# Patient Record
Sex: MALE | Race: WHITE
[De-identification: names, ages, dates, MRNs, and addresses within clinical notes are randomized per-mention and may not be internally consistent; named-entity substitution may affect disease eponyms.]

---

## 2018-08-21 ENCOUNTER — HOSPITAL ENCOUNTER (EMERGENCY)
Dept: HOSPITAL 97 - ER | Age: 58
Discharge: HOME | End: 2018-08-21
Payer: COMMERCIAL

## 2018-08-21 DIAGNOSIS — Z88.0: ICD-10-CM

## 2018-08-21 DIAGNOSIS — I10: ICD-10-CM

## 2018-08-21 DIAGNOSIS — K29.70: Primary | ICD-10-CM

## 2018-08-21 LAB
ALBUMIN SERPL BCP-MCNC: 3.6 G/DL (ref 3.4–5)
ALP SERPL-CCNC: 88 U/L (ref 45–117)
ALT SERPL W P-5'-P-CCNC: 33 U/L (ref 12–78)
AMYLASE SERPL-CCNC: 65 U/L (ref 25–115)
AST SERPL W P-5'-P-CCNC: 28 U/L (ref 15–37)
BUN BLD-MCNC: 21 MG/DL (ref 7–18)
CKMB CREATINE KINASE MB: 1.5 NG/ML (ref 0.3–3.6)
GLUCOSE SERPLBLD-MCNC: 106 MG/DL (ref 74–106)
HCT VFR BLD CALC: 43.2 % (ref 39.6–49)
LIPASE SERPL-CCNC: 190 U/L (ref 73–393)
LYMPHOCYTES # SPEC AUTO: 2.6 K/UL (ref 0.7–4.9)
MCH RBC QN AUTO: 29.3 PG (ref 27–35)
MCV RBC: 87.8 FL (ref 80–100)
PMV BLD: 10.2 FL (ref 7.6–11.3)
POTASSIUM SERPL-SCNC: 4.3 MMOL/L (ref 3.5–5.1)
RBC # BLD: 4.92 M/UL (ref 4.33–5.43)
UA COMPLETE W REFLEX CULTURE PNL UR: (no result)

## 2018-08-21 PROCEDURE — 81015 MICROSCOPIC EXAM OF URINE: CPT

## 2018-08-21 PROCEDURE — 83690 ASSAY OF LIPASE: CPT

## 2018-08-21 PROCEDURE — 82550 ASSAY OF CK (CPK): CPT

## 2018-08-21 PROCEDURE — 99284 EMERGENCY DEPT VISIT MOD MDM: CPT

## 2018-08-21 PROCEDURE — 80076 HEPATIC FUNCTION PANEL: CPT

## 2018-08-21 PROCEDURE — 36415 COLL VENOUS BLD VENIPUNCTURE: CPT

## 2018-08-21 PROCEDURE — 82150 ASSAY OF AMYLASE: CPT

## 2018-08-21 PROCEDURE — 80048 BASIC METABOLIC PNL TOTAL CA: CPT

## 2018-08-21 PROCEDURE — 81003 URINALYSIS AUTO W/O SCOPE: CPT

## 2018-08-21 PROCEDURE — 93005 ELECTROCARDIOGRAM TRACING: CPT

## 2018-08-21 PROCEDURE — 85025 COMPLETE CBC W/AUTO DIFF WBC: CPT

## 2018-08-21 PROCEDURE — 82553 CREATINE MB FRACTION: CPT

## 2018-08-21 PROCEDURE — 84484 ASSAY OF TROPONIN QUANT: CPT

## 2018-08-21 NOTE — EKG
Test Date:    2018-08-21               Test Time:    01:29:15

Technician:   AK                                     

                                                     

MEASUREMENT RESULTS:                                       

Intervals:                                           

Rate:         74                                     

VA:           134                                    

QRSD:         96                                     

QT:           390                                    

QTc:          432                                    

Axis:                                                

P:            48                                     

VA:           134                                    

QRS:          45                                     

T:            30                                     

                                                     

INTERPRETIVE STATEMENTS:                                       

                                                     

Normal sinus rhythm

Normal ECG

No previous ECG available for comparison



Electronically Signed On 08-21-18 12:44:19 CDT by [employer]

## 2018-08-21 NOTE — EDPHYS
Physician Documentation                                                                           

 [facility]                                                                

Name: Julian Stevenson                                                                               

Age: 58 yrs                                                                                       

Sex: Male                                                                                         

: 1960                                                                                   

MRN: [id number]                                                                                   

Arrival Date: 2018                                                                          

Time: 01:03                                                                                       

Account#: [id number]                                                                            

Bed 14                                                                                            

Private MD: Donald Torres T                                                                        

ED Physician Jorge Hamilton                                                                     

HPI:                                                                                              

                                                                                             

01:49 This 58 yrs old Unknown Male presents to ER via Ambulatory with complaints of Abdominal tw4 

      Pain.                                                                                       

01:49 The patient presents with abdominal pain. Onset: The symptoms/episode began/occurred    tw4 

      just prior to arrival. The symptoms do not radiate. Associated signs and symptoms:          

      none. The symptoms are described as dull. Modifying factors: The symptoms are               

      alleviated by nothing, the symptoms are aggravated by nothing. Severity of pain: At its     

      worst the pain was moderate in the emergency department the pain has improved. The          

      patient has not experienced similar symptoms in the past.                                   

                                                                                                  

Historical:                                                                                       

- Allergies:                                                                                      

01:17 PENICILLINS;                                                                            aa1 

- Home Meds:                                                                                      

01:17 None [Active];                                                                          aa1 

- PMHx:                                                                                           

01:17 Hypertension;                                                                           aa1 

- PSHx:                                                                                           

01:17 None;                                                                                   aa1 

                                                                                                  

- Immunization history:: Flu vaccine is not up to date.                                           

- Social history:: Smoking status: Patient/guardian denies using tobacco.                         

- Ebola Screening: : No symptoms or risks identified at this time.                                

                                                                                                  

                                                                                                  

ROS:                                                                                              

01:49 Constitutional: Negative for fever, chills, and weight loss, Cardiovascular: Negative   tw4 

      for chest pain, palpitations, and edema, Respiratory: Negative for shortness of breath,     

      cough, wheezing, and pleuritic chest pain, MS/Extremity: Negative for injury and            

      deformity, Skin: Negative for injury, rash, and discoloration, Neuro: Negative for          

      headache, weakness, numbness, tingling, and seizure.                                        

01:49 Eyes: Negative for injury, pain, redness, and discharge, Neck: Negative for injury,         

      pain, and swelling, : Negative for injury, bleeding, discharge, and swelling.             

01:49 Abdomen/GI: Positive for abdominal pain, Negative for nausea and vomiting, nausea,          

      vomiting, and diarrhea, nausea, vomiting, diarrhea.                                         

                                                                                                  

Exam:                                                                                             

01:50 Constitutional:  This is a well developed, well nourished patient who is awake, alert,  tw4 

      and in no acute distress. Head/Face:  Normocephalic, atraumatic. Chest/axilla:  Normal      

      chest wall appearance and motion.  Nontender with no deformity.  No lesions are             

      appreciated. Cardiovascular:  Regular rate and rhythm with a normal S1 and S2.  No          

      gallops, murmurs, or rubs.  Normal PMI, no JVD.  No pulse deficits. Respiratory:  Lungs     

      have equal breath sounds bilaterally, clear to auscultation and percussion.  No rales,      

      rhonchi or wheezes noted.  No increased work of breathing, no retractions or nasal          

      flaring. Abdomen/GI:  Soft, non-tender, with normal bowel sounds.  No distension or         

      tympany.  No guarding or rebound.  No evidence of tenderness throughout. Back:  No          

      spinal tenderness.  No costovertebral tenderness.  Full range of motion. MS/ Extremity:     

       Pulses equal, no cyanosis.  Neurovascular intact.  Full, normal range of motion.           

      Neuro:  Awake and alert, GCS 15, oriented to person, place, time, and situation.            

      Cranial nerves II-XII grossly intact.  Motor strength 5/5 in all extremities.  Sensory      

      grossly intact.  Cerebellar exam normal.  Normal gait.                                      

                                                                                                  

Vital Signs:                                                                                      

01:17  / 105; Pulse 76; Resp 16; Temp 97.5; Pulse Ox 99% on R/A; Weight 94.8 kg; Height aa1 

      5 ft. 9 in. (175.26 cm); Pain 3/10;                                                         

02:15  / 91; Pulse 77; Resp 16; Pulse Ox 95% on R/A; Pain 3/10;                         aa1 

03:19  / 99; Pulse 78; Resp 16; Pulse Ox 95% on R/A; Pain 2/10;                         ak1 

04:01  / 99; Pulse 66; Resp 16; Temp 97.6; Pulse Ox 96% on R/A; Pain 1/10;              ak1 

01:17 Body Mass Index 30.86 (94.80 kg, 175.26 cm)                                             aa1 

                                                                                                  

MDM:                                                                                              

01:14 Patient medically screened.                                                             tw4 

07:04 Data reviewed: vital signs, nurses notes. Data interpreted: Cardiac monitor: rhythm is  tw4 

      normal sinus rhythm, Pulse oximetry: Interpretation: normal. Counseling: I had a            

      detailed discussion with the patient and/or guardian regarding: the historical points,      

      exam findings, and any diagnostic results supporting the discharge/admit diagnosis.         

      Medication response: GI Cocktail relieved the patient's pain. The symptoms have             

      resolved. Response to treatment: the patient's symptoms have resolved after treatment,      

      and as a result, I will discharge patient. Special discussion: I discussed with the         

      patient/guardian in detail that at this point there is no indication for admission to       

      the hospital. It is understood, however, that if the symptoms persist or worsen the         

      patient needs to return immediately for re-evaluation.                                      

                                                                                                  

                                                                                             

01:11 Order name: Amylase, Serum; Complete Time: 04:03                                                                                                                                     

01:11 Order name: Basic Metabolic Panel; Complete Time: 04:03                                                                                                                              

04:04 Interpretation: BUN 21; GFR 62.                                                                                                                                                      

01:11 Order name: CBC with Diff; Complete Time: 04:03                                                                                                                                      

01:12 Order name: Hepatic Function; Complete Time: 04:03                                                                                                                                   

01:12 Order name: Lipase; Complete Time: 04:03                                                                                                                                             

01:12 Order name: Urine Microscopic Only; Complete Time: 04:03                                                                                                                             

01:23 Order name: Troponin (emerg Dept Use Only); Complete Time: 04:03                                                                                                                     

01:47 Order name: Creatine Phosphokinase; Complete Time: 04:03                                EDMS

                                                                                             

01:47 Order name: CKMB Creatine Kinase MB; Complete Time: 04:03                               EDMS

                                                                                             

02:08 Order name: Urine Dipstick--Ancillary (enter results); Complete Time: 04:03             ms  

                                                                                             

03:12 Order name: Troponin I; Complete Time: 04:03                                                                                                                                         

04:04 Interpretation: Within normal limits: TROP < 0.02.                                                                                                                                   

01:12 Order name: IV Saline Lock; Complete Time: :                                                                                             

01:12 Order name: Labs collected and sent; Complete Time: :                                                                                             

01:12 Order name: Urine Dipstick-Ancillary (obtain specimen); Complete Time: :                                                                                             

01:23 Order name: EKG; Complete Time: : 

                                                                                                  

EC:29 Rate is 74 beats/min. Rhythm is regular. QRS Axis is Normal. IN interval is normal. QRS tw4 

      interval is normal. QT interval is normal. No Q waves. T waves are Normal. No ST            

      changes noted. Clinical impression: Normal ECG. Interpreted by me. Reviewed by me.          

                                                                                                  

Administered Medications:                                                                         

04:13 Drug: GI Cocktail without Donnatal - (Maalox Suspension 30 ml, Lidocaine Liquid 2 % 15  ak1 

      ml) Route: PO;                                                                              

04:29 Follow up: Response: No adverse reaction; Pain is decreased                             ak1 

                                                                                                  

                                                                                                  

Disposition:                                                                                      

18 04:05 Discharged to Home. Impression: Gastritis, unspecified, without bleeding.          

- Condition is Stable.                                                                            

- Discharge Instructions: Gastritis, Adult.                                                       

- Prescriptions for Protonix 40 mg Oral Tablet - take 1 tablet by ORAL route once                 

  daily; 30 tablet.                                                                               

- Medication Reconciliation Form, Thank You Letter, Antibiotic Education, Prescription            

  Opioid Use form.                                                                                

- Follow up: Donald Torres MD; When: Upon discharge from the Emergency Department;                 

  Reason: Further diagnostic work-up, Recheck today's complaints, Re-evaluation by your           

  physician.                                                                                      

- Problem is new.                                                                                 

- Symptoms have improved.                                                                         

                                                                                                  

                                                                                                  

                                                                                                  

Signatures:                                                                                       

Dispatcher [employer]                           EDMS                                                 

Codie Carballo RN                        RN   aa1                                                  

Yue Maldonado RN                       RN   ak1                                                  

Jorge Hamilton MD MD   tw4                                                  

                                                                                                  

Corrections: (The following items were deleted from the chart)                                    

01:46 01:24 CKMB+C.LAB.BRZ ordered. EDMS                                                      EDMS

01:46 01:24 CREATINE PHOSPHOKINASE+C.LAB.BRZ ordered. [facility]                                    EDMS

01:47 01:12 Creatinine for Radiology+C.LAB.BRZ ordered. [facility]                                  EDMS

04:34 04:05 2018 04:05 Discharged to Home. Impression: Gastritis, unspecified, without  ak1 

      bleeding. Condition is Stable. Forms are Medication Reconciliation Form, Thank You          

      Letter, Antibiotic Education, Prescription Opioid Use. Follow up: Donald Torres; When:         

      Upon discharge from the Emergency Department; Reason: Further diagnostic work-up,           

      Recheck today's complaints, Re-evaluation by your physician. Problem is new. Symptoms       

      have improved. tw4                                                                          

                                                                                                  

**************************************************************************************************

## 2018-08-21 NOTE — ER
Nurse's Notes                                                                                     

 [facility]                                                                

Name: Julian Stevenson                                                                               

Age: 58 yrs                                                                                       

Sex: Male                                                                                         

: 1960                                                                                   

MRN: [id number]                                                                                   

Arrival Date: 2018                                                                          

Time: 01:03                                                                                       

Account#: [id number]                                                                            

Bed 14                                                                                            

Private MD: Donald Torres T                                                                        

Diagnosis: Gastritis, unspecified, without bleeding                                               

                                                                                                  

Presentation:                                                                                     

                                                                                             

01:12 Presenting complaint: Patient states: epigastric pain that woke him up from sleep at    aa1 

      approx 2330. Reports pain felt as if there was air trapped inside. Reports taking some      

      OTC indigestion medication and symptoms have improved but have not completely resolved.     

      Transition of care: patient was not received from another setting of care. Onset of         

      symptoms was 2018 at 23:30. Risk Assessment: Do you want to hurt yourself or     

      someone else? Patient reports no desire to harm self or others. Initial Sepsis Screen:      

      Does the patient meet any 2 criteria? No. Patient's initial sepsis screen is negative.      

      Does the patient have a suspected source of infection? Yes: Acute abdominal pain. Care      

      prior to arrival: None.                                                                     

01:12 Method Of Arrival: Ambulatory                                                           aa1 

01:12 Acuity: JOVANI 3                                                                           aa1 

                                                                                                  

Historical:                                                                                       

- Allergies:                                                                                      

01:17 PENICILLINS;                                                                            aa1 

- Home Meds:                                                                                      

01:17 None [Active];                                                                          aa1 

- PMHx:                                                                                           

01:17 Hypertension;                                                                           aa1 

- PSHx:                                                                                           

01:17 None;                                                                                   aa1 

                                                                                                  

- Immunization history:: Flu vaccine is not up to date.                                           

- Social history:: Smoking status: Patient/guardian denies using tobacco.                         

- Ebola Screening: : No symptoms or risks identified at this time.                                

                                                                                                  

                                                                                                  

Screenin:10 Abuse screen: Denies threats or abuse. Denies injuries from another. Nutritional        aa1 

      screening: No deficits noted. Tuberculosis screening: No symptoms or risk factors           

      identified. Fall Risk None identified.                                                      

                                                                                                  

Assessment:                                                                                       

01:10 General: Appears in no apparent distress. comfortable, Behavior is calm, cooperative,   aa1 

      appropriate for age. Pain: Complains of pain in epigastric area Pain currently is 3 out     

      of 10 on a pain scale. Quality of pain is described as sharp, Pain began 2 hours ago.       

      Is continuous. Neuro: Level of Consciousness is awake, alert, obeys commands, Oriented      

      to person, place, time, situation, Moves all extremities. Full function Gait is steady.     

      Cardiovascular: Denies chest pain, diaphoresis, palpitations, shortness of breath,          

      Heart tones S1 S2 present Capillary refill < 3 seconds Clubbing of nail beds is absent      

      JVD is absent Patient's skin is warm and dry. Rhythm is regular. Respiratory: Airway is     

      patent Respiratory effort is even, unlabored, Respiratory pattern is regular,               

      symmetrical. GI: Abdomen is non-distended, Bowel sounds present X 4 quads. Abd is soft      

      and non tender X 4 quads. Reports epigastric pain, indigestion. : No signs and/or         

      symptoms were reported regarding the genitourinary system. EENT: No signs and/or            

      symptoms were reported regarding the EENT system. Derm: Skin is intact, is healthy with     

      good turgor, Skin is pink, warm \T\ dry. Musculoskeletal: Circulation, motion, and          

      sensation intact. Capillary refill < 3 seconds.                                             

02:19 Reassessment: Patient appears in no apparent distress at this time. Patient and/or      aa1 

      family updated on plan of care and expected duration. Pain level reassessed. Patient is     

      alert, oriented x 3, equal unlabored respirations, skin warm/dry/pink. Awaiting lab         

      results.                                                                                    

03:19 Reassessment: Patient appears in no apparent distress at this time. Patient and/or      ak1 

      family updated on plan of care and expected duration. Pain level reassessed. Patient is     

      alert, oriented x 3, equal unlabored respirations, skin warm/dry/pink. pt informed of       

      wait for repeat lab results.                                                                

04:12 Reassessment: pt stated his epigastric pain has returned. ERP notified. verbal orders   ak1 

      for GI cocktail to be given. .                                                              

                                                                                                  

Vital Signs:                                                                                      

01:17  / 105; Pulse 76; Resp 16; Temp 97.5; Pulse Ox 99% on R/A; Weight 94.8 kg; Height aa1 

      5 ft. 9 in. (175.26 cm); Pain 3/10;                                                         

02:15  / 91; Pulse 77; Resp 16; Pulse Ox 95% on R/A; Pain 3/10;                         aa1 

03:19  / 99; Pulse 78; Resp 16; Pulse Ox 95% on R/A; Pain 2/10;                         ak1 

04:01  / 99; Pulse 66; Resp 16; Temp 97.6; Pulse Ox 96% on R/A; Pain 1/10;              ak1 

01:17 Body Mass Index 30.86 (94.80 kg, 175.26 cm)                                             aa1 

                                                                                                  

ED Course:                                                                                        

01:03 Patient arrived in ED.                                                                  es  

01:04 Donald Torres MD is Private Physician.                                                   es  

01:10 Codie Carballo, RN is Primary Nurse.                                                      aa1 

01:10 Patient has correct armband on for positive identification. Placed in gown. Bed in low  aa1 

      position. Call light in reach. Cardiac monitor on. Pulse ox on. NIBP on. Warm blanket       

      given.                                                                                      

01:14 Jorge Hamilton MD is Attending Physician.                                            tw4 

01:14 Triage completed.                                                                       aa1 

01:17 Arm band placed on right wrist. Patient placed in an exam room, on a stretcher.         aa1 

01:20 Initial lab(s) drawn, by ED staff, sent to lab. Urine collected: clean catch specimen.  aa1 

      Inserted saline lock: 20 gauge in right antecubital area, using aseptic technique.          

      ,using aseptic technique. by Jones Maldonado RN Blood collected.                              

01:40 EKG done, by ED staff, reviewed by Jorge Hamilton MD.                                  aa1 

04:03 No provider procedures requiring assistance completed.                                  ak1 

04:05 Donald Torres MD is Referral Physician.                                                  tw4 

04:33 IV discontinued, intact, bleeding controlled, No redness/swelling at site. Pressure     ak1 

      dressing applied.                                                                           

                                                                                                  

Administered Medications:                                                                         

04:13 Drug: GI Cocktail without Donnatal - (Maalox Suspension 30 ml, Lidocaine Liquid 2 % 15  ak1 

      ml) Route: PO;                                                                              

04:29 Follow up: Response: No adverse reaction; Pain is decreased                             ak1 

                                                                                                  

                                                                                                  

Outcome:                                                                                          

04:05 Discharge ordered by MD.                                                                tw4 

04:33 Discharged to home ambulatory, with family.                                             ak1 

04:33 Condition: good                                                                             

04:33 Discharge instructions given to patient, family, Instructed on discharge instructions,      

      follow up and referral plans. no drinking with medication, no driving heavy equipment,      

      medication usage, Demonstrated understanding of instructions, follow-up care,               

      medications, Prescriptions given X 1.                                                       

04:34 Patient left the ED.                                                                    ak1 

                                                                                                  

Signatures:                                                                                       

Codie Carballo, DOMINGUEZ                        RN   aa1                                                  

Yarely Romero Amber, RN RN ak1 Wadley, Terrence, MD MD   tw4                                                  

                                                                                                  

Corrections: (The following items were deleted from the chart)                                    

03:22 02:31 Reassessment: Patient appears in no apparent distress at this time. Patient is    aa1 

      alert, oriented x 3, equal unlabored respirations, skin warm/dry/pink. PA at bedside.       

      Pt states he is not suicidal. Reports he was more depressed than anything and would         

      like to be discharged aa1                                                                   

                                                                                                  

**************************************************************************************************